# Patient Record
Sex: FEMALE | URBAN - METROPOLITAN AREA
[De-identification: names, ages, dates, MRNs, and addresses within clinical notes are randomized per-mention and may not be internally consistent; named-entity substitution may affect disease eponyms.]

---

## 2024-09-30 ENCOUNTER — TELEPHONE (OUTPATIENT)
Dept: TRANSPLANT | Facility: CLINIC | Age: 34
End: 2024-09-30

## 2024-09-30 NOTE — LETTER
September 30, 2024    Dr. Aleksandar Hawthorne  [facility]  [address] [place] [address]  [address]  [place] [address]  Phone: [phone]  Fax: [phone]           Dear Dr. Aleksandar Hawthorne:    Patient: Malissa Kelsey   MR Number: [id number]   YOB: 1990     Thank you for the referral of Malissa Kelsey to our lung transplant program. Your patients demographic and clinical information have been submitted for transplant provider review and financial clearance. Once the provider and insurance company has approved the consult for your patient, she will be contacted by our office re: the date for an appointment. We will update you once this initial appointment has been scheduled. You will receive an after-visit summary following the completion of your patients appointment in our clinic.    Thank you again for your trust in our program. If there is anything we can do for you or your staff, please feel free to contact us at [phone].    Sincerely,       Glory Huggins D.O.  Medical Director, Lung Transplant  Pulmonary & Critical Care Medicine    [facility]  [address] [place] [address]  [phone] tel  [phone] fax       
3

## 2024-10-10 ENCOUNTER — TELEPHONE (OUTPATIENT)
Dept: TRANSPLANT | Facility: CLINIC | Age: 34
End: 2024-10-10
Payer: MEDICAID

## 2024-10-10 NOTE — LETTER
October 10, 2024    Aleksandar Hawthorne  [address] [place] [address]  Phone: [phone]  Fax: [phone]          Dear Aleksandar Castrejonney:    Patient: Malissa Kelsey   MR Number: [id number]   YOB: 1990     Thank you for the referral of Malissa Kelsey to our lung transplant program.    Upon reviewing the information provided by your office, we find that Malissa Kelsey is not a potential candidate for lung transplantation at [facility] due to the following:      Insurance is out of network, requires patient to go to a [facility] ([facility]).      Once again, we appreciate your referral to our center and we look forward to working with you in the future.     If you have any questions or concerns regarding this decision, then please do not hesitate to contact me at [phone].    Sincerely,       Glory Huggins D.O.  Medical Director, Lung Transplant  Pulmonary & Critical Care Medicine    [facility]  [address] [place] [address]  [phone] tel  [phone] fax     CS/fmk

## 2024-10-10 NOTE — TELEPHONE ENCOUNTER
"Referral routed to financial cooridnator, contacted referring provider to receive updated insurance information if possible. Fax number provided.  ----- Message from Glory Huggins DO sent at 10/10/2024 10:39 AM CDT -----  Regarding: RE: LUT referral? not stated if ALD vs. LUT referral  I imagine LUT.  Will need PFTs, 6MWT, and TTE.  Will need to bring a disc with CT imaging.  ----- Message -----  From: Lynne Marion  Sent: 10/9/2024  10:56 PM CDT  To: Glory Huggins DO  Subject: LUT referral? not stated if ALD vs. LUT refe#    Lung Transplant Referral Note     Referral from: Dr. YEMI Hanna    Lung diagnosis: CTD-ILD     Age: 34 y.o. female    Height/Weight/BMI:  5' 6" / 69.4 kg  / 24.69 kg/m²     Smoking history: Social History     Tobacco Use       Smoking status: never       Smokeless tobacco: never     Other Drug use: denies    PFT date: not found/received     6 MWT date: not found / received   Oxygen saturation 97% on ? Room air at rest. Does ?not? have portable Oxygen.       CXR date: 7/24/24   Findings: Unchanged multifocal mid to lower lung predominant reticular and patchy airspace opacities. No pneumothorax or significant pleural effusion. Normal heart size. No acute osseous abnormality. Moderate gaseous distention of the stomach.     Chest CT date: 6/18/24   Impression:   1. No significant change in advanced pulmonary fibrosis with   peripheral and bibasilar predominant disease. No acute infiltrates.   2. Anterior mediastinal cas or thymic tissue and mildly prominent   hilar lymph nodes are unchanged.   3. The small juxta fissural pulmonary nodules and 7 mm posterior right   upper lobe nodule versus focal scarring is unchanged. There are no new   nodules.     Echo date: not found/received     Impression: not found     EBUS 7/24/24: pathology:  Marked architectural distortion with fibrosis, honeycomb change and rare fibroblast foci most suggestive of usual interstitial pneumonia.  - See " comment  Lavage: A. LUNG, RIGHT MIDDLE LOBE, BRONCHOALVEOLAR LAVAGE:  - Alveolar macrophages, ciliated columnar epithelial cells, and mixed inflammatory cells.  - No granulomas or malignant cells identified.  - Adequately controlled stain for AFB is negative for mycobacterial organisms.  - Adequately controlled stain for GMS is negative for fungal organisms.   - ThinPrep and sections of cell block examined.  Fungal cx:   Penicillium species Abnormal      Other pertinent medical history:   Asthma  Bronchiectasis  ILD  PNA  URI  Seeing rheumatology locally - noted non-steroid responsive, planning immunosuppression     Recommendations?

## 2024-10-15 ENCOUNTER — TELEPHONE (OUTPATIENT)
Dept: TRANSPLANT | Facility: CLINIC | Age: 34
End: 2024-10-15
Payer: MEDICAID

## 2024-10-15 NOTE — LETTER
October 15, 2024    Aleksandar Hawthorne MD  [address] [place] [address]  [place] [address]  Phone: [phone]  Fax: [phone]      Dear Dr. Aleksandar Hawthorne:    Patient: Malissa Kelsey   MR Number: [id number]   YOB: 1990     Thank you for the referral of Malissa Kelsey to our lung transplant program. Upon reviewing the information provided by your office, we find that Malissa Kelsey is not a potential candidate for lung transplantation at [facility] due to the following:      Her insurance is out of network for our facility.      Once again, we appreciate your referral to our center and we look forward to working with you in the future. If you have any questions or concerns regarding this decision, then please do not hesitate to contact me at [phone].    Sincerely,       Glory Huggins D.O.  Medical Director, Lung Transplant  Pulmonary & Critical Care Medicine    [facility]  [address] [place] [address]  [phone]

## 2024-10-15 NOTE — TELEPHONE ENCOUNTER
Received a denial from [facility] for a lung transplant consult due to the facility being out of network for our facility.    Contacted [facility]. Spoke with . Message sent to Dr. Hawthorne's staff regarding patient's denial for lung transplantation at our facility due to her insurance being out of network.    Letter also sent to referring provider.